# Patient Record
Sex: MALE | Race: WHITE
[De-identification: names, ages, dates, MRNs, and addresses within clinical notes are randomized per-mention and may not be internally consistent; named-entity substitution may affect disease eponyms.]

---

## 2018-03-03 ENCOUNTER — HOSPITAL ENCOUNTER (EMERGENCY)
Dept: HOSPITAL 43 - DL.ED | Age: 23
Discharge: HOME | End: 2018-03-03
Payer: COMMERCIAL

## 2018-03-03 DIAGNOSIS — J45.909: ICD-10-CM

## 2018-03-03 DIAGNOSIS — L23.9: Primary | ICD-10-CM

## 2018-03-03 PROCEDURE — 96374 THER/PROPH/DIAG INJ IV PUSH: CPT

## 2018-03-03 PROCEDURE — 94640 AIRWAY INHALATION TREATMENT: CPT

## 2018-03-03 PROCEDURE — 99283 EMERGENCY DEPT VISIT LOW MDM: CPT

## 2018-03-03 PROCEDURE — 96375 TX/PRO/DX INJ NEW DRUG ADDON: CPT

## 2018-03-03 NOTE — EDM.PDOC
Scribed by Angélica Lei 03/03/18 1205 for Oliver Weber MD





ED HPI GENERAL MEDICAL PROBLEM





- General


Chief Complaint: Allergic Reaction


Stated Complaint: [id number] SWOLLEN HANDS FEVER AT TRAINING


Time Seen by Provider: 03/03/18 10:55


Source of Information: Reports: Patient, RN, RN Notes Reviewed


History Limitations: Reports: No Limitations





- History of Present Illness


INITIAL COMMENTS - FREE TEXT/NARRATIVE: 


Arrives here by POV with onset this morning of bilateral palmar hand itching 

and swelling. Patient ate breakfast, drank a glass of water and was teaching a 

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 

Denies any rash or hives anywhere else on his body. Denies any other areas of 

itching. Denies any swelling of the face, lips, tongue or throat. Patient 

states he has developed a sensation of wheezing today. He does not know if it 

is associated with the other symptoms or note. Denies any shortness of breath 

or difficulty breathing. Denies any prior history of allergic reactions or 

asthma.


Onset: Today


Duration: Getting Worse


Location: Reports: Upper Extremity, Left, Upper Extremity, Right


Quality: Reports: Other (swelling)


Severity: Moderate


Improves with: Reports: None


Worsens with: Reports: None


Associated Symptoms: Reports: No Other Symptoms





- Related Data


 Allergies











Allergy/AdvReac Type Severity Reaction Status Date / Time


 


No Known Allergies Allergy   Verified 03/03/18 10:52











Home Meds: 


 Home Meds





. [No Known Home Meds]  03/03/18 [History]











Social & Family History





- Family History


Family Medical History: Noncontributory





ED ROS ALLERGIC REACTION





- Review of Systems


Review Of Systems: ROS reveals no pertinent complaints other than HPI.





ED EXAM GENERAL NO PERIP PULSE





- Physical Exam


Exam: See Below


Exam Limited By: No Limitations


General Appearance: Alert, WD/WN, No Apparent Distress


Eye Exam: Bilateral Eye: Normal Inspection


Ears: Normal External Exam


Nose: Normal Inspection, Normal Mucosa, No Blood


Throat/Mouth: Normal Inspection, Normal Lips, Normal Teeth, Normal Gums, Normal 

Oropharynx, Normal Voice, No Airway Compromise


Head: Atraumatic, Normocephalic


Neck: Normal Inspection, Supple, Non-Tender, Full Range of Motion.  No: 

Lymphadenopathy (L), Lymphadenopathy (R)


Respiratory/Chest: No Respiratory Distress, No Accessory Muscle Use, Chest Non-

Tender, Wheezing.  No: Crackles, Rales, Rhonchi, Stridor


Cardiovascular: Normal Peripheral Pulses, Regular Rate, Rhythm, No Edema, No 

Gallop, No JVD, No Murmur, No Rub


GI/Abdominal: Normal Bowel Sounds, Soft, Non-Tender, No Distention


 (Male) Exam: Deferred


Rectal (Males) Exam: Deferred


Back Exam: Normal Inspection, Full Range of Motion, NT


Extremities: Normal Inspection, Normal Range of Motion, Non-Tender, No Pedal 

Edema, Normal Capillary Refill


Neurological: Alert, Oriented, CN II-XII Intact, Normal Cognition, Normal Gait, 

No Motor/Sensory Deficits


Psychiatric: Normal Affect, Normal Mood


Skin Exam: Warm, Dry, Intact, Other (Bilateral palmar hands with moderate 

swelling,flushed pink/mild erythema blanching with urticarial wheel/flare 

features. No urticaria or rash anywhere else on his body. )





Course





- Vital Signs


Last Recorded V/S: 


 Last Vital Signs











Temp  37.1 C   03/03/18 10:49


 


Pulse  88   03/03/18 11:55


 


Resp  18   03/03/18 11:55


 


BP  144/79 H  03/03/18 11:55


 


Pulse Ox  99   03/03/18 11:55














- Orders/Labs/Meds


Orders: 


 Active Orders 24 hr











 Category Date Time Status


 


 Peripheral IV Care [RC] .AS DIRECTED Care  03/03/18 11:04 Active


 


 RT Aerosol Therapy [RC] ASDIRECTED Care  03/03/18 11:04 Active


 


 RT Post Treatment Assessment [RC] Click to Edit Care  03/03/18 11:42 Active


 


 RT Pre-Treatment Assessment [RC] Click to Edit Care  03/03/18 11:42 Active


 


 Peripheral IV Insertion Adult [OM.PC] Stat Oth  03/03/18 11:04 Ordered











Meds: 


Medications














Discontinued Medications














Generic Name Dose Route Start Last Admin





  Trade Name Freq  PRN Reason Stop Dose Admin


 


Albuterol  2.5 mg  03/03/18 11:04  03/03/18 11:10





  Proventil Neb Soln  NEB  03/03/18 11:05  2.5 mg





  ONETIME ONE   Administration


 


Albuterol  6.7 gm  03/03/18 11:42  03/03/18 11:55





  Proventil Hfa  INH  03/03/18 11:43  Not Given





  ONETIME ONE   


 


Diphenhydramine HCl  25 mg  03/03/18 11:04  03/03/18 11:13





  Benadryl  IVPUSH  03/03/18 11:05  25 mg





  ONETIME ONE   Administration


 


Methylprednisolone Sodium Succinate  125 mg  03/03/18 11:04  03/03/18 11:11





  Solu-Medrol  IVPUSH  03/03/18 11:05  125 mg





  ONETIME ONE   Administration


 


Sodium Chloride  10 ml  03/03/18 11:04  03/03/18 11:14





  Saline Flush  FLUSH   10 ml





  ASDIRECTED PRN   Administration





  Keep Vein Open   














Departure





- Departure


Time of Disposition: 11:43


Disposition: Home, Self-Care 01


Condition: Good


Clinical Impression: 


Allergic reaction


Qualifiers:


 Encounter type: initial encounter Qualified Code(s): T78.40XA - Allergy, 

unspecified, initial encounter





Contact dermatitis


Qualifiers:


 Contact dermatitis type: allergic Contact dermatitis trigger: unspecified 

trigger Qualified Code(s): L23.9 - Allergic contact dermatitis, unspecified 

cause





Reactive airway disease


Qualifiers:


 Asthma severity: mild Asthma persistence: unspecified Qualified Code(s): 

J45.909 - Unspecified asthma, uncomplicated








- Discharge Information


Instructions:  Contact Dermatitis, Easy-to-Read, Hives, Easy-to-Read


Forms:  ED Department Discharge


Additional Instructions: 


RX: Prednisone 20mg.


RX: Zyrtec 10mg.


Use Albuterol inhaler 2 puffs every 4 hours until wheezing resolves. 


Follow up in clinic next week with your primary doctor for recheck of your 

allergic reaction and wheezing. 


Return to ER if worse at any time. 





- My Orders


Last 24 Hours: 


My Active Orders





03/03/18 11:04


Peripheral IV Care [RC] .AS DIRECTED 


RT Aerosol Therapy [RC] ASDIRECTED 


Peripheral IV Insertion Adult [OM.PC] Stat 





03/03/18 11:42


RT Post Treatment Assessment [RC] Click to Edit 


RT Pre-Treatment Assessment [RC] Click to Edit 














- Assessment/Plan


Last 24 Hours: 


My Active Orders





03/03/18 11:04


Peripheral IV Care [RC] .AS DIRECTED 


RT Aerosol Therapy [RC] ASDIRECTED 


Peripheral IV Insertion Adult [OM.PC] Stat 





03/03/18 11:42


RT Post Treatment Assessment [RC] Click to Edit 


RT Pre-Treatment Assessment [RC] Click to Edit 














I have read and agree with the documentation that has been completed regarding 

this visit. By signing this record, I attest that the documentation was 

completed in my physical presence and is an accurate record of the encounter.

## 2021-03-31 ENCOUNTER — HOSPITAL ENCOUNTER (EMERGENCY)
Dept: HOSPITAL 41 - JD.ED | Age: 26
Discharge: HOME | End: 2021-03-31
Payer: COMMERCIAL

## 2021-03-31 DIAGNOSIS — Z72.0: ICD-10-CM

## 2021-03-31 DIAGNOSIS — R07.89: Primary | ICD-10-CM

## 2021-03-31 NOTE — EDM.PDOC
ED HPI GENERAL MEDICAL PROBLEM





- General


Chief Complaint: Cardiovascular Problem


Stated Complaint: SOB/CHEST TIGHTNESS


Time Seen by Provider: 03/31/21 12:23


Source of Information: Reports: Patient, RN Notes Reviewed


History Limitations: Reports: No Limitations





- History of Present Illness


INITIAL COMMENTS - FREE TEXT/NARRATIVE: 





Patient is a 25-year-old male who presents to the ED for the evaluation of his 

chest tightness and shortness of breath.  Patient notes he has been having 

issues with this for quite some time.  Roughly 1-1/2 years ago, he wore a 

cardiac monitor for 30 days that he was having some episodes but they did not 

catch any episodes like he was experiencing.  Patient notes that he developed 

some chest tightness and shortness of breath starting roughly 2 days ago.  He 

notes that he just feels like he cannot catch his breath, and that his heart is 

beating out of his chest.  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 

his oxygen levels were normal.  He notes that he gets tested for COVID-19 on a 

regular basis and does not feel sick otherwise so does not have any concerns 

about COVID-19 at today's visit.  He does state that roughly a month ago he was 

walking through [employer] with some friends, and he checked his apple watch when 

he felt his heart racing, and he noted that his heart rate was in the 190s.  He 

was not sure how accurate that was, so he did not get evaluated at that time.  

He does note that he was not dizzy or lightheaded at that time.  He states that 

he has not been able to get much sleep over the last 2 or 3 nights due to the 

discomfort he is experiencing.  Does note that he drinks a Monster energy drink 

pretty much daily, and he did have  half of a, Frappuccino earlier today as 

well.  He states that he does use preworkout as well when he goes to the gym, 

but does not feel the symptoms at the gym.  He denies any fevers or chills, 

nausea/vomiting/diarrhea, he does note a chronic cough.  States that he also is 

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





- Related Data


                                    Allergies











Allergy/AdvReac Type Severity Reaction Status Date / Time


 


No Known Allergies Allergy   Verified 03/03/18 10:52 CST











Home Meds: 


                                    Home Meds





. [No Known Home Meds]  03/03/18 [History]











Past Medical History





- Past Health History


Medical/Surgical History: Denies Medical/Surgical History





- Infectious Disease History


Infectious Disease History: Reports: Chicken Pox, Influenza





Social & Family History





- Family History


Family Medical History: No Pertinent Family History





- Tobacco Use


Tobacco Use Status *Q: Current Every Day Tobacco User





- Recreational Drug Use


Recreational Drug Use: No





ED ROS GENERAL





- Review of Systems


Review Of Systems: Comprehensive ROS is negative, except as noted in HPI.





ED EXAM, GENERAL





- Physical Exam


Exam: See Below


Exam Limited By: No Limitations


General Appearance: Alert, WD/WN, No Apparent Distress


Respiratory/Chest: No Respiratory Distress, Lungs Clear, Normal Breath Sounds, 

No Accessory Muscle Use, Chest Non-Tender


Cardiovascular: Normal Peripheral Pulses, Regular Rate, Rhythm, No Edema


Peripheral Pulses: 2+: Radial (L), Radial (R)


Extremities: Normal Inspection, Normal Capillary Refill


Neurological: Alert, Oriented, Normal Cognition, No Motor/Sensory Deficits


Psychiatric: Normal Affect, Normal Mood


Skin Exam: Warm, Dry, Intact, Normal Color, No Rash


  ** #1 Interpretation


EKG Date: 03/31/21


Time: 12:22


Rhythm: NSR


Rate (Beats/Min): 87


Axis: Normal


P-Wave: Present


QRS: Normal


ST-T: Normal


QT: Normal


Comparison: NA - No Prior EKG


EKG Interpretation Comments: 





No obvious ischemia or acute ST changes noted, reviewed by myself and Dr. Knapp.





Course





- Vital Signs


Last Recorded V/S: 


                                Last Vital Signs











Temp  99.0 F   03/31/21 12:23


 


Pulse  86   03/31/21 12:23


 


Resp  18   03/31/21 12:23


 


BP  150/79 H  03/31/21 12:23


 


Pulse Ox  100   03/31/21 12:23














- Orders/Labs/Meds


Orders: 


                               Active Orders 24 hr











 Category Date Time Status


 


 EKG Documentation Completion [RC] STAT Care  03/31/21 12:28 Ordered











Labs: 


                                Laboratory Tests











  03/31/21 03/31/21 Range/Units





  12:55 12:55 


 


WBC  7.53   (4.23-9.07)  K/mm3


 


RBC  4.81   (4.63-6.08)  M/mm3


 


Hgb  14.0   (13.7-17.5)  gm/dl


 


Hct  41.0   (40.1-51.0)  %


 


MCV  85.2   (79.0-92.2)  fl


 


MCH  29.1   (25.7-32.2)  pg


 


MCHC  34.1   (32.2-35.5)  g/dl


 


RDW Std Deviation  41.8   (35.1-43.9)  fL


 


Plt Count  206   (163-337)  K/mm3


 


MPV  9.5   (9.4-12.3)  fl


 


Neut % (Auto)  63.2   (34.0-67.9)  %


 


Lymph % (Auto)  24.2   (21.8-53.1)  %


 


Mono % (Auto)  10.1   (5.3-12.2)  %


 


Eos % (Auto)  1.9   (0.8-7.0)  


 


Baso % (Auto)  0.3   (0.1-1.2)  %


 


Neut # (Auto)  4.77   (1.78-5.38)  K/mm3


 


Lymph # (Auto)  1.82   (1.32-3.57)  K/mm3


 


Mono # (Auto)  0.76   (0.30-0.82)  K/mm3


 


Eos # (Auto)  0.14   (0.04-0.54)  K/mm3


 


Baso # (Auto)  0.02   (0.01-0.08)  K/mm3


 


Sodium   142  (136-145)  mEq/L


 


Potassium   3.5  (3.5-5.1)  mEq/L


 


Chloride   105  ()  mEq/L


 


Carbon Dioxide   24  (21-32)  mEq/L


 


Anion Gap   16.5 H  (5-15)  


 


BUN   15  (7-18)  mg/dL


 


Creatinine   1.3  (0.7-1.3)  mg/dL


 


Est Cr Clr Drug Dosing   89.69  mL/min


 


Estimated GFR (MDRD)   > 60  (>60)  mL/min


 


BUN/Creatinine Ratio   11.5 L  (14-18)  


 


Glucose   102  ()  mg/dL


 


Calcium   9.2  (8.5-10.1)  mg/dL


 


Magnesium   1.9  (1.8-2.4)  mg/dl


 


Total Bilirubin   0.5  (0.2-1.0)  mg/dL


 


AST   40 H  (15-37)  U/L


 


ALT   57  (16-63)  U/L


 


Alkaline Phosphatase   54  ()  U/L


 


Troponin I   < 0.017  (0.00-0.056)  ng/mL


 


Total Protein   7.1  (6.4-8.2)  g/dl


 


Albumin   4.2  (3.4-5.0)  g/dl


 


Globulin   2.9  gm/dL


 


Albumin/Globulin Ratio   1.5  (1-2)  


 


TSH 3rd Generation   2.059  (0.358-3.74)  uIU/mL














- Re-Assessments/Exams


Free Text/Narrative Re-Assessment/Exam: 





03/31/21 12:50


Patient presents to the ED for the evaluation of his chest tightness and 

shortness of breath.  EKG done at time of triage demonstrates normal sinus 

rhythm with no acute ST change or abnormalities reviewed by myself and Dr. Muna white.  We will go ahead and get baseline labs to include CBC, CMP, troponin, 

magnesium level and a TSH for evaluation to see if something could be happening 

metabolically.  I did go over possibility of outpatient cardiac monitoring if 

everything looks okay, the patient notes that he will try to get this set up in 

[place] when he moves her on Saturday.





03/31/21 13:41


Patient's chest x-ray demonstrates no focal abnormalities, CBC has resulted and 

is unremarkable.





03/31/21 14:02


Labs are done, and are unremarkable.  He did find out the provider he had seen 

in the past, this is Latisha Patel.  I did ask him to get in contact with her 

regarding transferring provider to [place], as she might know better options on 

who to go see.  Patient was amenable to this plan.  I did offer to do a 48-hour 

Holter monitor but he declined at this time due to his impending move to [place], he notes that he will follow up with a provider in that area for ongoing 

cardiac management.





Departure





- Departure


Time of Disposition: 14:03


Disposition: Home, Self-Care 01


Condition: Good


Clinical Impression: 


 Chest tightness





Instructions:  Nonspecific Chest Pain, Adult, Easy-to-Read


Referrals: 


PCP,None [Primary Care Provider] - 


Forms:  ED Department Discharge


Additional Instructions: 


You were seen in this ER for your chest discomfort/shortness of breath.





EKG, chest x-ray, laboratory evaluation done at today's visit are all 

unremarkable.





48-hour Holter monitoring was offered, but you declined due to your impending 

move to [place], highly and strongly recommend you follow-up with a provider

either in [place], or at the  in [place] for ongoing 

cardiac management highly recommend you get prolonged cardiac monitoring to see 

if they can catch 1 of these episodes.





Try to decrease the amount of caffeine you use in a daily basis to see if this 

helps relieve some of your symptoms.





Please return to the ER at any time if symptoms change or worsen.





Sepsis Event Note (ED)





- Evaluation


Sepsis Screening Result: No Definite Risk





- Focused Exam


Vital Signs: 


                                   Vital Signs











  Temp Pulse Resp BP Pulse Ox


 


 03/31/21 12:23  99.0 F  86  18  150/79 H  100














- My Orders


Last 24 Hours: 


My Active Orders





03/31/21 12:28


EKG Documentation Completion [RC] STAT 














- Assessment/Plan


Last 24 Hours: 


My Active Orders





03/31/21 12:28


EKG Documentation Completion [RC] STAT

## 2021-03-31 NOTE — CR
Chest: Portable view of the chest was obtained.

 

Comparison: No prior chest imaging is available.

 

Heart size and mediastinum are normal.  Lungs are clear with no acute 

parenchymal change.  No acute osseous abnormality is appreciated.

 

Impression:

1.  Nothing acute is seen on portable chest x-ray.

 

Diagnostic code #1